# Patient Record
(demographics unavailable — no encounter records)

---

## 2025-07-03 NOTE — HISTORY OF PRESENT ILLNESS
[FreeTextEntry1] : 68 yr old female with past medical history remarkable for glaucoma and mixed stress/urge urinary incontinence presenting to establish care for type 2 diabetes mellitus.   PCP: Dr. Marie  Current HPI: Undergoing workup for rheumatoid arthritis; saw Dr. Cevallos, gave stretching exercises. Sometimes feels weakness in hands and objects drop from her hands. 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 Andi Marie Ph: [phone]  Regarding diabetes history: Diagnosed ~20 yrs ago  A1c 6.4% today- 7/3/25  Current regimen: - trulicity 1.5 mg weekly  Previous therapies tried: - pioglitazone 30 mg  Glycemic monitoring: not checking, doesn't have glucometer  Hypoglycemia symptoms/events: denies  Diet: eats mix of fruits/vegetables, olive oil, organic foods  Physical activity: exercise as much as she can- walking 30 minutes several times per week and doing stretching exercises  Family history of DM in parent  Diabetes complications and comorbidities: Retinopathy- denies, last seen by ophthalmology on 5/2024 Neuropathy- denies Nephropathy- denies HTN- denies Coronary artery disease- denies CVA- denies Dyslipidemia- previously was taking atorvastatin and then stopped taking when she ran out of refills Also hx of fatty liver

## 2025-07-03 NOTE — HISTORY OF PRESENT ILLNESS
[FreeTextEntry1] : 68 yr old female with past medical history remarkable for glaucoma and mixed stress/urge urinary incontinence presenting to establish care for type 2 diabetes mellitus.   PCP: Dr. Marie  Current HPI: Undergoing workup for rheumatoid arthritis; saw Dr. Cevallos, gave stretching exercises. Sometimes feels weakness in hands and objects drop from her hands. 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 Andi Marie Ph: [phone]  Regarding diabetes history: Diagnosed ~20 yrs ago  A1c 6.4% today- 7/3/25  Current regimen: - trulicity 1.5 mg weekly  Previous therapies tried: - pioglitazone 30 mg  Glycemic monitoring: not checking, doesn't have glucometer  Hypoglycemia symptoms/events: denies  Diet: eats mix of fruits/vegetables, olive oil, organic foods  Physical activity: exercise as much as she can- walking 30 minutes several times per week and doing stretching exercises  Family history of DM in parent  Diabetes complications and comorbidities: Retinopathy- denies, last seen by ophthalmology on 5/2024 Neuropathy- denies Nephropathy- denies HTN- denies Coronary artery disease- denies CVA- denies Dyslipidemia- previously was taking atorvastatin and then stopped taking when she ran out of refills Also hx of fatty liver

## 2025-07-03 NOTE — ASSESSMENT
[FreeTextEntry1] : 68 yr old female with past medical history remarkable for fatty liver, glaucoma and mixed stress/urge urinary incontinence presenting to establish care for type 2 diabetes mellitus   Type 2 Diabetes c/b fatty liver Well controlled with A1c 6.4% today  Discussed BG goals below: BG monitoring: sent pharmacist choice glucometer to her pharmacy today, advised to start monitoring 1-2x per day Target HbA1c <7% Pre-prandial target  mg/dL Post-prandial target <180 mg/dL  Medication regimen: - Stop trulicity - Start mounjaro 2.5 mg weekly for greater weight loss benefits and for MASLD benefits. We discussed the pros and cons of GLP-1/GLP-1 + GIP, including the benefits of glucose lowering, weight loss, cardiovascular and renal benefits and the warnings for pancreatitis, medullary thyroid cancer, and acute kidney injury. Patient is agreeable with starting a medication of this class.  I counseled that patient that if severe abdominal pain and nausea and vomiting occurs, the pt should stop GLP and go to the ER. We also discussed the more mild side effect of abdominal discomfort/nausea with GLP initiation, which should improve over time. The patient was instructed to let me know if the side effects are intolerable/do not improve.  Lifestyle modifications: Recommend increasing weekly physical activity with goal 150 minutes/week and dietary modifications- limiting sugary beverages and fried foods, incorporating more fruits/vegetables and whole grains in diet   Monitoring for complications: - Ophthalmology: due for retinopathy screening, advised her to make f/u appt - Podiatry: no active issues - Renal: will request labs from her PCP per her request  BP monitoring, with goal BP<130/80 Well controlled today, 108/68  Overweight BMI 26 - Counseled on lifestyle modifications: increased weekly physical activity with goal 150 minutes/week and dietary modifications- limiting sugary beverages and fried foods, incorporating more fruits/vegetables and whole grains in diet  RTC in 4 months

## 2025-07-03 NOTE — PHYSICAL EXAM
[TextEntry] :  Constitutional: alert and no acute distress. Well developed Eyes: no proptosis and no lid lag  Neck: no neck mass was observed   Pulmonary: no respiratory distress, no accessory muscle use  Cardiac: RRR, non-tachycardic,  Vascular: no peripheral edema   Abdomen: nondistended Back: no spinal tenderness, no scoliosis Endocrine: no stigmata of Cushings Syndrome  Musculoskeletal: normal gait and no clubbing or cyanosis of the fingernails  Neurology:   no tremors  Psychiatric: oriented to person, place, and time and insight and judgment were intact  Skin: no rash, no acne

## 2025-07-03 NOTE — HISTORY OF PRESENT ILLNESS
[FreeTextEntry1] : 68 yr old female with past medical history remarkable for glaucoma and mixed stress/urge urinary incontinence presenting to establish care for type 2 diabetes mellitus.   PCP: Dr. Marie  Current HPI: Undergoing workup for rheumatoid arthritis; saw Dr. Cevallos, gave stretching exercises. Sometimes feels weakness in hands and objects drop from her hands. 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 Andi Marie Ph: [phone]  Regarding diabetes history: Diagnosed ~20 yrs ago  A1c 6.4% today- 7/3/25  Current regimen: - trulicity 1.5 mg weekly  Previous therapies tried: - pioglitazone 30 mg  Glycemic monitoring: not checking, doesn't have glucometer  Hypoglycemia symptoms/events: denies  Diet: eats mix of fruits/vegetables, olive oil, organic foods  Physical activity: exercise as much as she can- walking 30 minutes several times per week and doing stretching exercises  Family history of DM in parent  Diabetes complications and comorbidities: Retinopathy- denies, last seen by ophthalmology on 5/2024 Neuropathy- denies Nephropathy- denies HTN- denies Coronary artery disease- denies CVA- denies Dyslipidemia- previously was taking atorvastatin and then stopped taking when she ran out of refills Also hx of fatty liver